# Patient Record
Sex: FEMALE | Race: WHITE | NOT HISPANIC OR LATINO
[De-identification: names, ages, dates, MRNs, and addresses within clinical notes are randomized per-mention and may not be internally consistent; named-entity substitution may affect disease eponyms.]

---

## 2020-11-03 ENCOUNTER — APPOINTMENT (OUTPATIENT)
Dept: FAMILY MEDICINE | Facility: CLINIC | Age: 35
End: 2020-11-03
Payer: COMMERCIAL

## 2020-11-03 VITALS
BODY MASS INDEX: 25.61 KG/M2 | SYSTOLIC BLOOD PRESSURE: 114 MMHG | WEIGHT: 150 LBS | DIASTOLIC BLOOD PRESSURE: 70 MMHG | OXYGEN SATURATION: 98 % | HEART RATE: 70 BPM | TEMPERATURE: 98.2 F | HEIGHT: 64 IN

## 2020-11-03 VITALS — DIASTOLIC BLOOD PRESSURE: 80 MMHG | SYSTOLIC BLOOD PRESSURE: 122 MMHG

## 2020-11-03 DIAGNOSIS — Z82.61 FAMILY HISTORY OF ARTHRITIS: ICD-10-CM

## 2020-11-03 DIAGNOSIS — Z82.49 FAMILY HISTORY OF ISCHEMIC HEART DISEASE AND OTHER DISEASES OF THE CIRCULATORY SYSTEM: ICD-10-CM

## 2020-11-03 DIAGNOSIS — Z13.1 ENCOUNTER FOR SCREENING FOR DIABETES MELLITUS: ICD-10-CM

## 2020-11-03 DIAGNOSIS — Z11.4 ENCOUNTER FOR SCREENING FOR HUMAN IMMUNODEFICIENCY VIRUS [HIV]: ICD-10-CM

## 2020-11-03 DIAGNOSIS — Z13.29 ENCOUNTER FOR SCREENING FOR OTHER SUSPECTED ENDOCRINE DISORDER: ICD-10-CM

## 2020-11-03 DIAGNOSIS — Z13.89 ENCOUNTER FOR SCREENING FOR OTHER DISORDER: ICD-10-CM

## 2020-11-03 DIAGNOSIS — E55.9 VITAMIN D DEFICIENCY, UNSPECIFIED: ICD-10-CM

## 2020-11-03 DIAGNOSIS — Z11.59 ENCOUNTER FOR SCREENING FOR OTHER VIRAL DISEASES: ICD-10-CM

## 2020-11-03 DIAGNOSIS — Z80.1 FAMILY HISTORY OF MALIGNANT NEOPLASM OF TRACHEA, BRONCHUS AND LUNG: ICD-10-CM

## 2020-11-03 DIAGNOSIS — Z13.220 ENCOUNTER FOR SCREENING FOR LIPOID DISORDERS: ICD-10-CM

## 2020-11-03 PROCEDURE — 99072 ADDL SUPL MATRL&STAF TM PHE: CPT

## 2020-11-03 PROCEDURE — G0442 ANNUAL ALCOHOL SCREEN 15 MIN: CPT

## 2020-11-03 PROCEDURE — 99385 PREV VISIT NEW AGE 18-39: CPT | Mod: 25

## 2020-11-03 NOTE — PLAN
[FreeTextEntry1] : \par - Blood work script \par - Will recommend Vitamin D based on blood work \par - Encouraged joining the patient portal \par - F/u 1 year, sooner if needed

## 2020-11-03 NOTE — HEALTH RISK ASSESSMENT
[Patient reported PAP Smear was normal] : Patient reported PAP Smear was normal [Good] : ~his/her~  mood as  good [Yes] : Yes [2 - 3 times a week (3 pts)] : 2 - 3  times a week (3 points) [3 or 4 (1 pt)] : 3 or 4  (1 point) [Never (0 pts)] : Never (0 points) [No] : In the past 12 months have you used drugs other than those required for medical reasons? No [0] : 2) Feeling down, depressed, or hopeless: Not at all (0) [None] : None [Employed] : employed [# Of Children ___] : has [unfilled] children [Feels Safe at Home] : Feels safe at home [Fully functional (bathing, dressing, toileting, transferring, walking, feeding)] : Fully functional (bathing, dressing, toileting, transferring, walking, feeding) [Fully functional (using the telephone, shopping, preparing meals, housekeeping, doing laundry, using] : Fully functional and needs no help or supervision to perform IADLs (using the telephone, shopping, preparing meals, housekeeping, doing laundry, using transportation, managing medications and managing finances) [HIV Test offered] : HIV Test offered [] : No [de-identified] : occasional  [Audit-CScore] : 4 [de-identified] : shea crespo  [[id number]] : 0 [PapSmearDate] : 10/20 [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

## 2020-11-03 NOTE — END OF VISIT
[FreeTextEntry3] : Medical record entries made by the scribe today today, were at my direction and personally dictated to them by me, Dr. Rosalie Peña on Nov 03, 2020. I have reviewed the chart and agree that the record accurately reflects my personal performance of the history, physical exam, assessment, and plan.\par

## 2020-11-03 NOTE — ADDENDUM
[FreeTextEntry1] : I, Isabel Banks acting as a scribe for Dr. Rosalie Peña on Nov 03, 2020  at 10:22 AM\par

## 2020-11-03 NOTE — HISTORY OF PRESENT ILLNESS
[FreeTextEntry1] : NP-CPE [de-identified] : MICHAEL is a 35 year female here as a new patient establishing care. Mood is good. Leads an active lifestyle. Eats a well balanced diet. States she has a [employer] bike. No complaints today. 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 She has two children and lives with her family. Follows up with GYN Dr. Annalise Bey. States she is medically healthy. Follows up with dermatology when needed. Bowel habits are normal.

## 2020-11-16 ENCOUNTER — TRANSCRIPTION ENCOUNTER (OUTPATIENT)
Age: 35
End: 2020-11-16

## 2021-01-04 ENCOUNTER — TRANSCRIPTION ENCOUNTER (OUTPATIENT)
Age: 36
End: 2021-01-04

## 2021-01-12 ENCOUNTER — TRANSCRIPTION ENCOUNTER (OUTPATIENT)
Age: 36
End: 2021-01-12

## 2021-02-16 ENCOUNTER — TRANSCRIPTION ENCOUNTER (OUTPATIENT)
Age: 36
End: 2021-02-16

## 2021-02-17 ENCOUNTER — TRANSCRIPTION ENCOUNTER (OUTPATIENT)
Age: 36
End: 2021-02-17

## 2021-08-16 ENCOUNTER — TRANSCRIPTION ENCOUNTER (OUTPATIENT)
Age: 36
End: 2021-08-16

## 2022-01-25 ENCOUNTER — APPOINTMENT (OUTPATIENT)
Dept: FAMILY MEDICINE | Facility: CLINIC | Age: 37
End: 2022-01-25
Payer: COMMERCIAL

## 2022-01-25 ENCOUNTER — NON-APPOINTMENT (OUTPATIENT)
Age: 37
End: 2022-01-25

## 2022-01-25 VITALS
BODY MASS INDEX: 23.9 KG/M2 | HEIGHT: 64 IN | OXYGEN SATURATION: 98 % | SYSTOLIC BLOOD PRESSURE: 118 MMHG | TEMPERATURE: 97.7 F | HEART RATE: 72 BPM | WEIGHT: 140 LBS | DIASTOLIC BLOOD PRESSURE: 80 MMHG

## 2022-01-25 DIAGNOSIS — F41.9 ANXIETY DISORDER, UNSPECIFIED: ICD-10-CM

## 2022-01-25 DIAGNOSIS — J20.9 ACUTE BRONCHITIS, UNSPECIFIED: ICD-10-CM

## 2022-01-25 PROCEDURE — G0444 DEPRESSION SCREEN ANNUAL: CPT | Mod: 59

## 2022-01-25 PROCEDURE — G0442 ANNUAL ALCOHOL SCREEN 15 MIN: CPT

## 2022-01-25 PROCEDURE — 99395 PREV VISIT EST AGE 18-39: CPT | Mod: 25

## 2022-01-26 NOTE — HISTORY OF PRESENT ILLNESS
[FreeTextEntry1] : CPE [de-identified] : Alycia is a 36 year female here today for a CPE. Patients mood is good and overall health seems to be well. Patient stated that she may have had 1 blood workup displaying increased TSH levels around 10 years ago. Other than that, she has not experienced high TSH levels until now. She denies any family medical history of thyroid issues. Patient denied any issues with bowel movements. Patient stated that she exercises when she can, using the [employer] exercise bike. She stated that her weight has been stable for some time now. 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 Patient stated she is seeking anti-anxiety medication for future travel plans. \par \par Patient reported having been exposed by COVID-19 via her daughter, right after Ara. She stated her daughter tested positive but was unsure if she certainly had been infected. She reports having contracted a slight cough near this time. She stated this cough has persisted since, as if she had a blockage in her throat. Patient confirmed having received the [employer] COVID-19 vaccination and booster vaccination\par

## 2022-01-26 NOTE — HEALTH RISK ASSESSMENT
[Patient reported mammogram was normal] : Patient reported mammogram was normal [Patient reported PAP Smear was normal] : Patient reported PAP Smear was normal [Good] : ~his/her~  mood as  good [Never] : Never [Yes] : Yes [2 - 3 times a week (3 pts)] : 2 - 3  times a week (3 points) [1 or 2 (0 pts)] : 1 or 2 (0 points) [Never (0 pts)] : Never (0 points) [No] : In the past 12 months have you used drugs other than those required for medical reasons? No [0] : 2) Feeling down, depressed, or hopeless: Not at all (0) [PHQ-2 Negative - No further assessment needed] : PHQ-2 Negative - No further assessment needed [None] : None [With Family] : lives with family [Employed] : employed [] :  [Feels Safe at Home] : Feels safe at home [Fully functional (bathing, dressing, toileting, transferring, walking, feeding)] : Fully functional (bathing, dressing, toileting, transferring, walking, feeding) [Fully functional (using the telephone, shopping, preparing meals, housekeeping, doing laundry, using] : Fully functional and needs no help or supervision to perform IADLs (using the telephone, shopping, preparing meals, housekeeping, doing laundry, using transportation, managing medications and managing finances) [Audit-CScore] : 3 [[id number]] : 0 [MammogramDate] : 11/21 [MammogramComments] : Qi  [PapSmearDate] : 10/21

## 2022-01-26 NOTE — PLAN
[FreeTextEntry1] : - Script provided for blood work to be done out of office\par - START: MethylPREDNISolone 4 MG Oral Tablet Therapy Pack, take as directed\par - Call office if blood work results are not given within 1 week.\par - Adv c/w monitoring of TSH levels

## 2022-04-11 PROBLEM — Z11.59 SCREENING FOR VIRAL DISEASE: Status: ACTIVE | Noted: 2020-11-03

## 2022-08-02 ENCOUNTER — TRANSCRIPTION ENCOUNTER (OUTPATIENT)
Age: 37
End: 2022-08-02

## 2022-08-23 ENCOUNTER — APPOINTMENT (OUTPATIENT)
Dept: FAMILY MEDICINE | Facility: CLINIC | Age: 37
End: 2022-08-23

## 2023-02-07 ENCOUNTER — RX RENEWAL (OUTPATIENT)
Age: 38
End: 2023-02-07

## 2023-11-21 ENCOUNTER — APPOINTMENT (OUTPATIENT)
Dept: FAMILY MEDICINE | Facility: CLINIC | Age: 38
End: 2023-11-21
Payer: COMMERCIAL

## 2023-11-21 VITALS
HEART RATE: 79 BPM | DIASTOLIC BLOOD PRESSURE: 70 MMHG | SYSTOLIC BLOOD PRESSURE: 104 MMHG | OXYGEN SATURATION: 98 % | BODY MASS INDEX: 25.1 KG/M2 | WEIGHT: 147 LBS | HEIGHT: 64 IN

## 2023-11-21 VITALS — SYSTOLIC BLOOD PRESSURE: 118 MMHG | DIASTOLIC BLOOD PRESSURE: 80 MMHG

## 2023-11-21 DIAGNOSIS — R53.83 OTHER FATIGUE: ICD-10-CM

## 2023-11-21 DIAGNOSIS — Z00.00 ENCOUNTER FOR GENERAL ADULT MEDICAL EXAMINATION W/OUT ABNORMAL FINDINGS: ICD-10-CM

## 2023-11-21 DIAGNOSIS — R79.89 OTHER SPECIFIED ABNORMAL FINDINGS OF BLOOD CHEMISTRY: ICD-10-CM

## 2023-11-21 DIAGNOSIS — Z97.5 PRESENCE OF (INTRAUTERINE) CONTRACEPTIVE DEVICE: ICD-10-CM

## 2023-11-21 DIAGNOSIS — E04.9 NONTOXIC GOITER, UNSPECIFIED: ICD-10-CM

## 2023-11-21 PROCEDURE — 99395 PREV VISIT EST AGE 18-39: CPT

## 2023-11-21 RX ORDER — METHYLPREDNISOLONE 4 MG/1
4 TABLET ORAL
Qty: 1 | Refills: 0 | Status: COMPLETED | COMMUNITY
Start: 2022-01-25 | End: 2023-11-21

## 2023-12-04 ENCOUNTER — TRANSCRIPTION ENCOUNTER (OUTPATIENT)
Age: 38
End: 2023-12-04

## 2023-12-04 DIAGNOSIS — E03.9 HYPOTHYROIDISM, UNSPECIFIED: ICD-10-CM

## 2023-12-04 RX ORDER — ALPRAZOLAM 0.25 MG/1
0.25 TABLET ORAL
Qty: 20 | Refills: 0 | Status: ACTIVE | COMMUNITY
Start: 2022-01-25 | End: 1900-01-01

## 2023-12-04 RX ORDER — LEVOTHYROXINE SODIUM 0.03 MG/1
25 TABLET ORAL
Qty: 90 | Refills: 0 | Status: ACTIVE | COMMUNITY
Start: 2023-12-04 | End: 1900-01-01

## 2023-12-05 ENCOUNTER — TRANSCRIPTION ENCOUNTER (OUTPATIENT)
Age: 38
End: 2023-12-05

## 2023-12-19 ENCOUNTER — NON-APPOINTMENT (OUTPATIENT)
Age: 38
End: 2023-12-19